# Patient Record
(demographics unavailable — no encounter records)

---

## 2024-10-17 NOTE — PROCEDURE
[FreeTextEntry1] : Suction rubber band ligation performed in the left lateral and right posterior positions.  Patient tolerated it well

## 2024-10-17 NOTE — HISTORY OF PRESENT ILLNESS
[FreeTextEntry1] : The patient reports continued discomfort from her hemorrhoids as well as prolapse that requires manual reduction

## 2024-10-17 NOTE — ASSESSMENT
[FreeTextEntry1] : We again discussed the options for treatment of her hemorrhoids including topical versus rubber banding versus excision.  She would like to continue to try rubber banding for now as well as topical treatment as needed.  She will follow-up in 3 to 4 weeks if her symptoms persist

## 2024-10-17 NOTE — PHYSICAL EXAM
[Excoriation] : no perianal excoriation [Fistula] : no fistulas [Wart] : no warts [Ulcer ___ cm] : no ulcers [Normal] : was normal [None] : there was no rectal mass  [Normal Breath Sounds] : Normal breath sounds [Wheezing] : no wheezing was heard [Normal Heart Sounds] : normal heart sounds [Normal Rate and Rhythm] : normal rate and rhythm [Alert] : alert [Oriented to Person] : oriented to person [Oriented to Place] : oriented to place [Oriented to Time] : oriented to time [Calm] : calm [de-identified] : soft, NT/ND [de-identified] : enlarged external hemorrhoids with overlying papillae in the left lateral and right posterior positions [de-identified] : Anoscopy reveals large internal hemorrhoids largest in the left lateral and right posterior positions [de-identified] : NAD [de-identified] : NCAT [de-identified] : supple [de-identified] : CRYSTAL [de-identified] : warm

## 2025-01-07 NOTE — HISTORY OF PRESENT ILLNESS
[FreeTextEntry1] : annual [de-identified] : 59 yo F with HTN, hx thyroiditis presents for annual.  HTN-- took meds little while ago. BPs at home 130s/80.  Pt declines flu, prevnar. UTD tdap

## 2025-01-07 NOTE — HEALTH RISK ASSESSMENT
[Patient reported mammogram was normal] : Patient reported mammogram was normal [Patient reported colonoscopy was normal] : Patient reported colonoscopy was normal [Fully functional (bathing, dressing, toileting, transferring, walking, feeding)] : Fully functional (bathing, dressing, toileting, transferring, walking, feeding) [Fully functional (using the telephone, shopping, preparing meals, housekeeping, doing laundry, using] : Fully functional and needs no help or supervision to perform IADLs (using the telephone, shopping, preparing meals, housekeeping, doing laundry, using transportation, managing medications and managing finances) [Reports normal functional visual acuity (ie: able to read med bottle)] : Reports normal functional visual acuity [Fair] :  ~his/her~ mood as fair [No falls in past year] : Patient reported no falls in the past year [de-identified] : none [de-identified] : regular [Reports changes in hearing] : Reports no changes in hearing [Reports changes in vision] : Reports no changes in vision [Reports changes in dental health] : Reports no changes in dental health [MammogramDate] : 04/24 [PapSmearComments] : s/p hysterectomy [ColonoscopyDate] : 09/24 [ColonoscopyComments] : 5 years

## 2025-01-07 NOTE — HEALTH RISK ASSESSMENT
[Patient reported mammogram was normal] : Patient reported mammogram was normal [Patient reported colonoscopy was normal] : Patient reported colonoscopy was normal [Fully functional (bathing, dressing, toileting, transferring, walking, feeding)] : Fully functional (bathing, dressing, toileting, transferring, walking, feeding) [Fully functional (using the telephone, shopping, preparing meals, housekeeping, doing laundry, using] : Fully functional and needs no help or supervision to perform IADLs (using the telephone, shopping, preparing meals, housekeeping, doing laundry, using transportation, managing medications and managing finances) [Reports normal functional visual acuity (ie: able to read med bottle)] : Reports normal functional visual acuity [Fair] :  ~his/her~ mood as fair [No falls in past year] : Patient reported no falls in the past year [de-identified] : none [de-identified] : regular [Reports changes in hearing] : Reports no changes in hearing [Reports changes in vision] : Reports no changes in vision [Reports changes in dental health] : Reports no changes in dental health [MammogramDate] : 04/24 [PapSmearComments] : s/p hysterectomy [ColonoscopyDate] : 09/24 [ColonoscopyComments] : 5 years

## 2025-01-07 NOTE — HISTORY OF PRESENT ILLNESS
[FreeTextEntry1] : annual [de-identified] : 59 yo F with HTN, hx thyroiditis presents for annual.  HTN-- took meds little while ago. BPs at home 130s/80.  Pt declines flu, prevnar. UTD tdap

## 2025-01-09 NOTE — DISCUSSION/SUMMARY
[FreeTextEntry1] : Affirm UA and culture Discussed need to see GYN Will follow up with results. All questions answered

## 2025-01-09 NOTE — HISTORY OF PRESENT ILLNESS
[FreeTextEntry1] : She was last seen in 6/2022 for a POA RTLH/BSO/SLNM, sulcal tear repair. Pathology- complex AEH, LN negative, washing negative  She called with complaints of one episode of hematuria, and pelvic pressure.  She is sexually active.  She denies any other concerns.   She is followed by Dr. Skaggs for hemorrhoids.  She has not see a GYN.

## 2025-01-09 NOTE — PHYSICAL EXAM
[Absent] : Adnexa(ae): Absent [Normal] : Anus and perineum: Normal sphincter tone, no masses, no prolapse. [de-identified] : vaginal cuff intact

## 2025-01-09 NOTE — PHYSICAL EXAM
[Absent] : Adnexa(ae): Absent [Normal] : Anus and perineum: Normal sphincter tone, no masses, no prolapse. [de-identified] : vaginal cuff intact

## 2025-01-22 NOTE — PHYSICAL EXAM
[Excoriation] : no perianal excoriation [Fistula] : no fistulas [Wart] : no warts [Ulcer ___ cm] : no ulcers [Normal] : was normal [None] : there was no rectal mass  [Normal Breath Sounds] : Normal breath sounds [Wheezing] : no wheezing was heard [Normal Heart Sounds] : normal heart sounds [Normal Rate and Rhythm] : normal rate and rhythm [Alert] : alert [Oriented to Person] : oriented to person [Oriented to Place] : oriented to place [Oriented to Time] : oriented to time [Calm] : calm [de-identified] : soft, NT/ND [de-identified] : enlarged external hemorrhoid with overlying papilla in the left lateral position [de-identified] : Anoscopy reveals enlarged internal hemorrhoids in the left lateral and right posterior positions [de-identified] : NAD [de-identified] : NCAT [de-identified] : supple [de-identified] : CRYSTAL [de-identified] : warm

## 2025-01-22 NOTE — PROCEDURE
[FreeTextEntry1] : suction RBL performed in the left lateral and right posterior positions. Pt omar it well